# Patient Record
Sex: FEMALE | Race: WHITE | NOT HISPANIC OR LATINO | URBAN - NONMETROPOLITAN AREA
[De-identification: names, ages, dates, MRNs, and addresses within clinical notes are randomized per-mention and may not be internally consistent; named-entity substitution may affect disease eponyms.]

---

## 2015-12-15 PROBLEM — H16.223: Noted: 2019-11-26

## 2015-12-15 PROBLEM — H10.413: Noted: 2020-01-13

## 2015-12-15 PROBLEM — H40.1131: Noted: 2017-01-12

## 2015-12-15 PROBLEM — H52.4: Noted: 2019-11-26

## 2017-07-13 PROBLEM — H52.03: Noted: 2017-07-13

## 2017-07-13 PROBLEM — H40.1131: Noted: 2017-01-12

## 2019-01-31 ENCOUNTER — IMPORTED ENCOUNTER (OUTPATIENT)
Dept: URBAN - NONMETROPOLITAN AREA CLINIC 1 | Facility: CLINIC | Age: 59
End: 2019-01-31

## 2019-01-31 PROCEDURE — 99214 OFFICE O/P EST MOD 30 MIN: CPT

## 2019-01-31 PROCEDURE — 92250 FUNDUS PHOTOGRAPHY W/I&R: CPT

## 2019-01-31 PROCEDURE — 92020 GONIOSCOPY: CPT

## 2019-01-31 PROCEDURE — 92015 DETERMINE REFRACTIVE STATE: CPT

## 2019-01-31 NOTE — PATIENT DISCUSSION
POAG OU:-  Discussed diagnosis in detail w/ pt today. -  Optos done today stable findings. -  Gonio done today grade IV with moderate pigment OU steep approach. -  VF done previously: unreliable OD with scattered defect unreliable OS with no defects-  OCT done previously was stable by GPA -  Hx of Yag PI OU patent on exam today. -  IOP stable at 18 OU previously around 15 OD and 14 OS. Will continue to monitor closely. Discussed punctal occlusion today after instilled drops OU.-  Cornea thickness noted 593  OS -  CDs noted at 0.65 OD and 0.6 OS stable. -  Continue Dorzolamide/Timolol OU BID as directed. May need to consider adding additional treatment/changing drops in future if IOP continues to stay aruond 18. -  Hx of using Combigan but stopped back in 9/2012.-  Continue to monitor every 6 months or PRN. Cataracts OU early-  Educated patient on condition-  No changes stable at this time. -  Continue to monitor for progressionOcular Allergies/WILLIAMS OU-  Educated patient on condition-  Signs/symptoms associated discussed-  Stable continue ATs PRN-  Advised to use OTC Alloway PRN. -  Continue to monitorHyperopia/astig/presby-  Discussed refractive status in detail w/ pt today-  MR done today new GLRx given-  Monitor yearly or PRN; 's Notes: MR  1/31/19DFE  7/13/17OCT 7/26/18Optos  1/31/19VF  7/26/18Gonio  1/31/19PACH 6/14/16 - Kassidy Reasons

## 2019-05-24 ENCOUNTER — IMPORTED ENCOUNTER (OUTPATIENT)
Dept: URBAN - NONMETROPOLITAN AREA CLINIC 1 | Facility: CLINIC | Age: 59
End: 2019-05-24

## 2019-05-24 PROCEDURE — 92133 CPTRZD OPH DX IMG PST SGM ON: CPT

## 2019-05-24 PROCEDURE — 92083 EXTENDED VISUAL FIELD XM: CPT

## 2019-05-24 PROCEDURE — 99214 OFFICE O/P EST MOD 30 MIN: CPT

## 2019-05-24 NOTE — PATIENT DISCUSSION
POAG OU:-  Discussed diagnosis in detail w/ pt today. -  Optos done last visit stable findings. -  Gonio done last visit grade IV with moderate pigment OU steep approach. -  VF done today: Reliable and within normal limits today OU. Better than previous. -  OCT done today was stable by GPA -  Hx of Yag PI OU patent on exam today. -  IOP stable at 18 OU previously around 15 OD and 14 OS. Will continue to monitor closely. Discussed punctal occlusion today after instilled drops OU.-  Cornea thickness noted 593  OS -  CDs noted at 0.65 OD and 0.6 OS stable. -  Continue Dorzolamide/Timolol OU BID as directed. May need to consider adding additional treatment/changing drops in future if IOP continues to stay aruond 18. Had a long conversation about adding another drop or just watching closely. Patient agreed to close monitoring for now so a 2nd drop will not be added at this time. -  Hx of using Combigan but stopped back in 9/2012.-  Continue to monitor every 6 months or PRN. Cataracts OU early-  Educated patient on condition-  No changes stable at this time. -  Continue to monitor for progressionOcular Allergies/WILLIAMS OU-  Educated patient on condition-  Signs/symptoms associated discussed-  Stable continue ATs PRN-  Advised to use OTC Alloway PRN. -  Continue to monitorHyperopia/astig/presby-  Discussed refractive status in detail w/ pt today-  MR done today new GLRx given-  Monitor yearly or PRN; 's Notes: MR  1/31/19DFE  7/13/17OCT 7/26/18Optos  1/31/19VF  7/26/18Gonio  1/31/19PACH 6/14/16 - Josué Ashley

## 2019-11-26 ENCOUNTER — IMPORTED ENCOUNTER (OUTPATIENT)
Dept: URBAN - NONMETROPOLITAN AREA CLINIC 1 | Facility: CLINIC | Age: 59
End: 2019-11-26

## 2019-11-26 PROCEDURE — 99214 OFFICE O/P EST MOD 30 MIN: CPT

## 2019-11-26 PROCEDURE — 92250 FUNDUS PHOTOGRAPHY W/I&R: CPT

## 2019-11-26 NOTE — PATIENT DISCUSSION
POAG OU:- Discussed diagnosis in detail w/ pt today. - Hx of Yag PI OU patent on exam today- Optos done today stable findings OU - Gonio done today grade IV with moderate pigment OU steep approach. - VF done previously Reliable and within normal limits today OU.  Better than previous. - OCT done previously was stable by GPA - PACHY  and - IOP 18 OU - Cup to Disc noted at OD 0.65 and OS 0.6  - Continue Dorzolamide/Timolol OU BID - Start Vyzulta at bedtime OU 2 samples given today (Due to pressures staying around 18)- Target pressure 15- Hx of using Combigan but stopped back in 9/2012- Patient xonsidering Glaucoma study may have [facility] consult with patient in the future - Continue to monitor  - RTC 1 month pressure checkNSC OU- Discussed diagnosis in detail with patient- Discussed signs and symptoms of progression- Discussed UV protection- No treatment needed at this time - Continue to monitorOcular Allergies/WILLIAMS OU- Discussed diagnosis in detail with patient- Discussed signs and symptoms of progression- Recommend patient drinking plenty of water and starting Omega 3’s - Recommend Refresh or Systane  throughout the day- Consider Restasis or plugs in the future if no improvement- Recommend Zaditor or Alaway BID OU PRN - Continue to monitorHyperopia/astig/presby-  Discussed refractive status in detail w/ pt today-  Patient DEFERS 11/26/19-  Continue to monitor; 's Notes: MR  1/31/19DFE  7/13/17OCT 7/26/18Optos  11/26/19VF  7/26/18Gonio  11/26/19PACH 6/14/16 - Sunshine John

## 2020-01-13 ENCOUNTER — IMPORTED ENCOUNTER (OUTPATIENT)
Dept: URBAN - NONMETROPOLITAN AREA CLINIC 1 | Facility: CLINIC | Age: 60
End: 2020-01-13

## 2020-01-13 PROCEDURE — 99213 OFFICE O/P EST LOW 20 MIN: CPT

## 2020-01-13 NOTE — PATIENT DISCUSSION
POAG OU:- Discussed diagnosis in detail w/ pt today. - Hx of Yag PI OU patent on exam today- Optos done previously stable findings OU - Gonio done previously grade IV with moderate pigment OU steep approach. - VF done previously Reliable and within normal limits today OU.  Better than previous. - OCT done previously was stable by GPA - PACHY  and - IOP OD 14 and OS 13- Cup to Disc noted at OD 0.65 and OS 0.6  - Continue Dorzolamide/Timolol OU BID - Continue Vyzulta at bedtime OU Rx sent to pharmacy - Target pressure 15- Hx of using Combigan but stopped back in 9/2012- Continue to monitor  - RTC 4 months F/U with VF [place] OU- Discussed diagnosis in detail with patient- Discussed signs and symptoms of progression- Discussed UV protection- No treatment needed at this time - Continue to monitorOcular Allergies/WILLIAMS OU- Discussed diagnosis in detail with patient- Discussed signs and symptoms of progression- Recommend patient drinking plenty of water and starting Omega 3’s - Recommend Refresh or Systane  throughout the day- Consider Restasis or plugs in the future if no improvement- Recommend Zaditor or Alaway BID OU PRN - Continue to monitorHyperopia/astig/presby-  Discussed refractive status in detail w/ pt today-  Patient DEFERS 11/26/19-  Continue to monitor; 's Notes:   1/31/19DFE  7/13/17OCT 7/26/18Optos  11/26/19VF  7/26/18Gonio  11/26/19PACH 6/14/16 - Lorraine Goodpasture

## 2020-05-27 ENCOUNTER — IMPORTED ENCOUNTER (OUTPATIENT)
Dept: URBAN - NONMETROPOLITAN AREA CLINIC 1 | Facility: CLINIC | Age: 60
End: 2020-05-27

## 2020-05-27 PROCEDURE — 99214 OFFICE O/P EST MOD 30 MIN: CPT

## 2020-05-27 NOTE — PATIENT DISCUSSION
POAG OU:- Discussed diagnosis in detail w/ pt today. - Hx of Yag PI OU patent on exam today- Optos done previously stable findings OU - Gonio done previously grade IV with moderate pigment OU steep approach. - VF done previously Reliable and within normal limits today OU.  Better than previous. - OCT done previously was stable by GPA - PACHY  and - IOP 14 OU- Cup to Disc noted at OD 0.65 and OS 0.6  - Continue Latanoprost QHS OU - Continue Cosopt BID OU - Hx of using Combigan but stopped back in 9/2012- Continue to monitor  - RTC 4 months F/U with VF [place] OU- Discussed diagnosis in detail with patient- Discussed signs and symptoms of progression- Discussed UV protection- No treatment needed at this time - Continue to monitorOcular Allergies/WILLIAMS OU- Discussed diagnosis in detail with patient- Discussed signs and symptoms of progression- Recommend patient drinking plenty of water and starting Omega 3’s - Recommend Refresh or Systane  throughout the day- Consider Restasis or plugs in the future if no improvement- Recommend Zaditor or Alaway BID OU PRN - Continue to monitorHyperopia/astig/presby-  Discussed refractive status in detail w/ pt today-  Patient DEFERS 11/26/19-  Continue to monitor; 's Notes: MR  1/31/19DFE  7/13/17OCT 7/26/18Optos  11/26/19VF  7/26/18Gonio  11/26/19PACH 6/14/16 - Mikle Scheuermann

## 2020-09-28 ENCOUNTER — IMPORTED ENCOUNTER (OUTPATIENT)
Dept: URBAN - NONMETROPOLITAN AREA CLINIC 1 | Facility: CLINIC | Age: 60
End: 2020-09-28

## 2020-09-28 PROBLEM — H16.223: Noted: 2019-11-26

## 2020-09-28 PROBLEM — H40.1131: Noted: 2017-01-12

## 2020-09-28 PROBLEM — H25.813: Noted: 2020-09-28

## 2020-09-28 PROBLEM — H52.4: Noted: 2019-11-26

## 2020-09-28 PROBLEM — H10.413: Noted: 2020-01-13

## 2020-09-28 PROCEDURE — 99213 OFFICE O/P EST LOW 20 MIN: CPT

## 2020-09-28 PROCEDURE — 92083 EXTENDED VISUAL FIELD XM: CPT

## 2020-09-28 NOTE — PATIENT DISCUSSION
POAG OU:- Discussed diagnosis in detail w/ pt today. - Hx of Yag PI OU patent on exam today- Optos done previously stable findings OU - Gonio done previously grade IV with moderate pigment OU steep approach. - VF done today OD shows Fair field with Borderline Paracentral Scotoma and OS shows Good field with Borderline Paracentral Scotoma. Slight progression OU - OCT done previously was stable by GPA - PACHY  and - IOP 18 OU- Cup to Disc noted at OD 0.65 and OS 0.6  - Continue Latanoprost QHS OU - Continue Cosopt BID OU - Hx of using Combigan but stopped back in 9/2012- Patient states that she has just started taking a oral medication for her stomach problems but does not know the name she states that she will call us and let us know what the name of it is. She is wondering if this medicine caused her pressure to go up today.  Pressures did go up to 18 OU today and states that she uses both drops as directed - Continue to monitor  - RTC 3 months F/U with OCT and MR Combined Cataracts OU- Discussed diagnosis in detail with patient- Discussed signs and symptoms of progression- Discussed UV protection- Progression noted today - No treatment needed at this time - Continue to monitorOcular Allergies/WILLIAMS OU- Discussed diagnosis in detail with patient- Discussed signs and symptoms of progression- Recommend patient drinking plenty of water and starting Omega 3’s - Recommend Refresh or Systane  throughout the day- Consider Restasis or plugs in the future if no improvement- Recommend Zaditor or Alaway BID OU PRN - Continue to monitorHyperopia/astig/presby-  Discussed refractive status in detail w/ pt today-  Patient DEFERS 11/26/19-  Continue to monitor; 's Notes: MR  1/31/19DFE  7/13/17OCT 7/26/18Optos  11/26/19VF 9/28/20Gonio  11/26/19PACH 6/14/16 - Jose D Kessler

## 2020-12-28 ENCOUNTER — IMPORTED ENCOUNTER (OUTPATIENT)
Dept: URBAN - NONMETROPOLITAN AREA CLINIC 1 | Facility: CLINIC | Age: 60
End: 2020-12-28

## 2020-12-28 PROCEDURE — 92015 DETERMINE REFRACTIVE STATE: CPT

## 2020-12-28 PROCEDURE — 99213 OFFICE O/P EST LOW 20 MIN: CPT

## 2020-12-28 PROCEDURE — 92133 CPTRZD OPH DX IMG PST SGM ON: CPT

## 2020-12-28 NOTE — PATIENT DISCUSSION
POAG OU:- Discussed diagnosis in detail w/ pt today. - Hx of Yag PI OU patent on exam today- Optos done previously stable findings OU - Gonio done previously grade IV with moderate pigment OU steep approach. - VF done previously OD shows Fair field with Borderline Paracentral Scotoma and OS shows Good field with Borderline Paracentral Scotoma.  Slight progression OU - OCT done today shows Borderline inferior NFL thinning OU - PACHY  and - IOP 18 OU- Cup to Disc noted at OD 0.65 and OS 0.6  - Continue Latanoprost QHS OU - Continue Cosopt BID OU - Hx of using Combigan but stopped back in 9/2012- Continue to monitor  - RTC 4 months F/U with Optos and BAT Combined Cataracts OU- Discussed diagnosis in detail with patient- Discussed signs and symptoms of progression- Discussed UV protection- Progression noted today - No treatment needed at this time - Continue to monitorOcular Allergies/WILLIAMS OU- Discussed diagnosis in detail with patient- Discussed signs and symptoms of progression- Recommend patient drinking plenty of water and starting Omega 3’s - Recommend Refresh or Systane  throughout the day- Recommend Zaditor or Alaway BID OU PRN - Continue to monitorHyperopia/astig/presby-  Discussed refractive status in detail w/ pt today-  New glasses RX given today -  Continue to monitor; 's Notes: MR  12/28/DFE  7/13/17OCT 12/28/20Optos 11/26/19VF 9/28/20Gonio  11/26/19PACH 6/14/16 - Celeste Armstrong

## 2021-05-11 ENCOUNTER — IMPORTED ENCOUNTER (OUTPATIENT)
Dept: URBAN - NONMETROPOLITAN AREA CLINIC 1 | Facility: CLINIC | Age: 61
End: 2021-05-11

## 2021-05-11 PROCEDURE — 99213 OFFICE O/P EST LOW 20 MIN: CPT

## 2021-05-11 PROCEDURE — 92250 FUNDUS PHOTOGRAPHY W/I&R: CPT

## 2021-05-11 NOTE — PATIENT DISCUSSION
POAG OU:- Discussed diagnosis in detail w/ pt today. - Hx of Yag PI OU patent on exam today- Optos done today stable findings OU - Gonio done previously grade IV with moderate pigment OU steep approach. - VF done previously OD shows Fair field with Borderline Paracentral Scotoma and OS shows Good field with Borderline Paracentral Scotoma.  Slight progression OU - OCT done previously shows Borderline inferior NFL thinning OU - PACHY  and - IOP 18 OU- Cup to Disc noted at OD 0.65 and OS 0.6  - Continue Latanoprost QHS OU - Continue Cosopt BID OU - Hx of using Combigan but stopped back in 9/2012- Continue to monitor  - RTC 4 months F/U with VF and BAT Combined Cataracts OU- Discussed diagnosis in detail with patient- Discussed signs and symptoms of progression- Discussed UV protection- Progression noted today - BAT done today OD 20/100 and OS 20/80- No treatment needed at this time - Continue to monitorOcular Allergies/WILLIAMS OU- Discussed diagnosis in detail with patient- Discussed signs and symptoms of progression- Recommend patient drinking plenty of water and starting Omega 3’s - Recommend Refresh or Systane  throughout the day- Recommend Zaditor or Alaway BID OU PRN - Continue to monitorHyperopia/astig/presby-  Discussed refractive status in detail w/ pt today-  Continue to monitor; 's Notes: MR  12/28/DFE  7/13/17OCT 12/28/20Optos 5/11/21VF 9/28/20Gonio  11/26/19PACH 6/14/16 - Leeanna Oseguera

## 2021-10-28 ENCOUNTER — IMPORTED ENCOUNTER (OUTPATIENT)
Dept: URBAN - NONMETROPOLITAN AREA CLINIC 1 | Facility: CLINIC | Age: 61
End: 2021-10-28

## 2021-10-28 ENCOUNTER — PREPPED CHART (OUTPATIENT)
Dept: URBAN - NONMETROPOLITAN AREA CLINIC 1 | Facility: CLINIC | Age: 61
End: 2021-10-28

## 2021-10-28 PROCEDURE — 99213 OFFICE O/P EST LOW 20 MIN: CPT

## 2021-10-28 PROCEDURE — 92083 EXTENDED VISUAL FIELD XM: CPT

## 2021-10-28 NOTE — PATIENT DISCUSSION
POAG OU:- Discussed diagnosis in detail w/ pt today. - Hx of Yag PI OU patent on exam today- Optos done previously stablefindings OU - Gonio done previously grade IV with moderate pigment OU steep approach. - VF done today OU shows Good fields and Normal - OCTdone previously shows Borderline inferior NFL thinning OU - PACHY  and - IOP 17 OU- Cup to Disc noted at OD 0.65 and OS 0.6 -Continue Latanoprost QHS OU - Continue Cosopt BID OU - Hx of using Combigan but stopped back in 9/2012- Continue to monitor Combined CataractsOU- Discussed diagnosis in detail with patient- Discussed signs and symptoms of progression- Discussed UV protection- Progression noted today -BAT done previously OD 20/100 and OS 20/80- No treatment needed at this time - Continue to monitorOcular Allergies/WILLIAMS OU- Discussed diagnosis indetail with patient- Discussed signs and symptoms of progression- Recommend patient drinking plenty of water and starting Omega 3?s - RecommendRefresh or Systane throughout the day- Recommend Zaditor or Alaway BID OU PRN - Continue to monitorHyperopia/astig/presby- Discussed refractivestatus in detail w/ pt today- Continue to monitor.

## 2021-10-28 NOTE — PATIENT DISCUSSION
POAG OU:- Discussed diagnosis in detail w/ pt today.  - Hx of Yag PI OU patent on exam today- Optos done previously stable findings OU - Gonio done previously grade IV with moderate pigment OU steep approach. - VF done today OU shows Good fields and Normal - OCT done previously shows Borderline inferior NFL thinning OU - PACHY  and - IOP 17 OU- Cup to Disc noted at OD 0.65 and OS 0.6  - Continue Latanoprost QHS OU - Continue Cosopt BID OU - Hx of using Combigan but stopped back in 9/2012- Continue to monitor  Combined Cataracts OU- Discussed diagnosis in detail with patient- Discussed signs and symptoms of progression- Discussed UV protection- Progression noted today - BAT done previously OD 20/100 and OS 20/80- No treatment needed at this time - Continue to monitorOcular Allergies/WILLIAMS OU- Discussed diagnosis in detail with patient- Discussed signs and symptoms of progression- Recommend patient drinking plenty of water and starting Omega 3’s - Recommend Refresh or Systane  throughout the day- Recommend Zaditor or Alaway BID OU PRN - Continue to monitorHyperopia/astig/presby-  Discussed refractive status in detail w/ pt today-  Continue to monitor; 's Notes: MR  12/28/DFE  7/13/17OCT 12/28/20Optos 5/11/21VF 10/28/21Gonio  11/26/19PACH 6/14/16 - Barabara Essex

## 2022-03-20 ASSESSMENT — VISUAL ACUITY
OD_CC: 20/20-1
OS_CC: 20/20-1

## 2022-03-20 ASSESSMENT — PACHYMETRY
OS_CT_UM: 540
OD_CT_UM: 593

## 2022-03-21 ENCOUNTER — FOLLOW UP (OUTPATIENT)
Dept: URBAN - NONMETROPOLITAN AREA CLINIC 1 | Facility: CLINIC | Age: 62
End: 2022-03-21

## 2022-03-21 DIAGNOSIS — H40.1131: ICD-10-CM

## 2022-03-21 PROCEDURE — 99213 OFFICE O/P EST LOW 20 MIN: CPT

## 2022-03-21 PROCEDURE — 92250 FUNDUS PHOTOGRAPHY W/I&R: CPT

## 2022-03-21 ASSESSMENT — TONOMETRY
OS_IOP_MMHG: 17
OD_IOP_MMHG: 16

## 2022-03-21 ASSESSMENT — VISUAL ACUITY
OS_CC: 20/20-2
OD_CC: 20/22

## 2022-03-31 ENCOUNTER — EMERGENCY VISIT (OUTPATIENT)
Dept: URBAN - NONMETROPOLITAN AREA CLINIC 1 | Facility: CLINIC | Age: 62
End: 2022-03-31

## 2022-03-31 DIAGNOSIS — H00.011: ICD-10-CM

## 2022-03-31 PROCEDURE — 99213 OFFICE O/P EST LOW 20 MIN: CPT

## 2022-03-31 RX ORDER — CEPHALEXIN 500 MG/1: 1 CAPSULE ORAL TWICE A DAY

## 2022-03-31 ASSESSMENT — VISUAL ACUITY
OS_CC: 20/20
OD_CC: 20/20

## 2022-03-31 ASSESSMENT — TONOMETRY
OD_IOP_MMHG: 16
OS_IOP_MMHG: 17

## 2022-03-31 NOTE — PATIENT DISCUSSION
Recommend HOT compresses 10-15 mins on and 45 mins. start Keflex 500mg BID X 7 days. RX sent to pharmacy.

## 2022-04-09 ASSESSMENT — VISUAL ACUITY
OD_SC: 20/20
OS_CC: 20/40
OS_CC: 20/30-2
OS_SC: 20/20
OD_PH: 20/20-
OD_CC: 20/25-2
OD_SC: 20/20
OS_CC: 20/20-
OD_CC: 20/20-2
OS_SC: 20/20-1
OD_SC: 20/20-
OD_CC: 20/40-1
OS_GLARE: 20/60
OS_GLARE: 20/80
OU_CC: 20/25-2
OS_SC: 20/20
OD_SC: 20/20
OS_CC: 20/29-2
OD_GLARE: 20/50
OS_PH: 20/20-
OD_CC: 20/30-1
OD_SC: 20/20-1
OD_GLARE: 20/100
OS_SC: 20/20
OS_SC: 20/20-

## 2022-04-09 ASSESSMENT — PACHYMETRY
OS_CT_UM: 540; ADJ: THIN
OD_CT_UM: 593; ADJ: THICK
OS_CT_UM: 540; ADJ: THIN
OD_CT_UM: 593; ADJ: THICK
OS_CT_UM: 540; ADJ: THIN
OS_CT_UM: 540; ADJ: THIN
OD_CT_UM: 593; ADJ: THICK
OD_CT_UM: 593; ADJ: THICK
OS_CT_UM: 540; ADJ: THIN
OD_CT_UM: 593; ADJ: THICK
OS_CT_UM: 540; ADJ: THIN
OS_CT_UM: 540; ADJ: THIN
OD_CT_UM: 593; ADJ: THICK
OS_CT_UM: 540; ADJ: THIN
OS_CT_UM: 540; ADJ: THIN
OD_CT_UM: 593; ADJ: THICK

## 2022-04-09 ASSESSMENT — TONOMETRY
OD_IOP_MMHG: 18
OS_IOP_MMHG: 18
OD_IOP_MMHG: 18
OS_IOP_MMHG: 18
OS_IOP_MMHG: 18
OS_IOP_MMHG: 14
OS_IOP_MMHG: 13
OS_IOP_MMHG: 18
OD_IOP_MMHG: 14
OD_IOP_MMHG: 14
OD_IOP_MMHG: 18
OD_IOP_MMHG: 18
OS_IOP_MMHG: 17
OD_IOP_MMHG: 18
OS_IOP_MMHG: 18
OS_IOP_MMHG: 17
OS_IOP_MMHG: 18
OD_IOP_MMHG: 17

## 2022-09-22 ENCOUNTER — ESTABLISHED PATIENT (OUTPATIENT)
Dept: URBAN - NONMETROPOLITAN AREA CLINIC 1 | Facility: CLINIC | Age: 62
End: 2022-09-22

## 2022-09-22 DIAGNOSIS — H52.4: ICD-10-CM

## 2022-09-22 PROCEDURE — 92014 COMPRE OPH EXAM EST PT 1/>: CPT

## 2022-09-22 PROCEDURE — 92015 DETERMINE REFRACTIVE STATE: CPT

## 2022-09-22 ASSESSMENT — TONOMETRY
OD_IOP_MMHG: 16
OS_IOP_MMHG: 16

## 2022-09-22 ASSESSMENT — VISUAL ACUITY
OS_CC: 20/20
OD_CC: 20/20-1

## 2023-07-20 ENCOUNTER — FOLLOW UP (OUTPATIENT)
Dept: URBAN - NONMETROPOLITAN AREA CLINIC 1 | Facility: CLINIC | Age: 63
End: 2023-07-20

## 2023-07-20 DIAGNOSIS — H16.223: ICD-10-CM

## 2023-07-20 DIAGNOSIS — H40.1131: ICD-10-CM

## 2023-07-20 DIAGNOSIS — H25.813: ICD-10-CM

## 2023-07-20 PROCEDURE — 99214 OFFICE O/P EST MOD 30 MIN: CPT

## 2023-07-20 PROCEDURE — 92133 CPTRZD OPH DX IMG PST SGM ON: CPT

## 2023-07-20 PROCEDURE — 92083 EXTENDED VISUAL FIELD XM: CPT

## 2023-07-20 ASSESSMENT — VISUAL ACUITY
OU_CC: 20/20
OS_CC: 20/20
OD_CC: 20/20-

## 2023-07-20 ASSESSMENT — TONOMETRY
OS_IOP_MMHG: 17
OD_IOP_MMHG: 16

## 2024-01-30 ENCOUNTER — ESTABLISHED PATIENT (OUTPATIENT)
Dept: URBAN - NONMETROPOLITAN AREA CLINIC 1 | Facility: CLINIC | Age: 64
End: 2024-01-30

## 2024-01-30 DIAGNOSIS — H52.4: ICD-10-CM

## 2024-01-30 PROCEDURE — 92014 COMPRE OPH EXAM EST PT 1/>: CPT

## 2024-01-30 PROCEDURE — 92015 DETERMINE REFRACTIVE STATE: CPT

## 2024-01-30 ASSESSMENT — TONOMETRY
OD_IOP_MMHG: 15
OS_IOP_MMHG: 15

## 2024-01-30 ASSESSMENT — VISUAL ACUITY
OD_CC: 20/20
OU_CC: 20/20
OS_CC: 20/20-1

## 2024-08-01 ENCOUNTER — FOLLOW UP (OUTPATIENT)
Dept: URBAN - NONMETROPOLITAN AREA CLINIC 1 | Facility: CLINIC | Age: 64
End: 2024-08-01

## 2024-08-01 DIAGNOSIS — H25.813: ICD-10-CM

## 2024-08-01 DIAGNOSIS — H40.1131: ICD-10-CM

## 2024-08-01 DIAGNOSIS — H16.223: ICD-10-CM

## 2024-08-01 PROCEDURE — 99213 OFFICE O/P EST LOW 20 MIN: CPT

## 2024-08-01 PROCEDURE — 92133 CPTRZD OPH DX IMG PST SGM ON: CPT

## 2024-08-01 PROCEDURE — 92083 EXTENDED VISUAL FIELD XM: CPT

## 2024-08-01 ASSESSMENT — VISUAL ACUITY
OU_CC: 20/20
OS_CC: 20/20-1
OD_CC: 20/20-1

## 2024-08-01 ASSESSMENT — TONOMETRY
OS_IOP_MMHG: 15
OD_IOP_MMHG: 15

## 2025-05-02 ENCOUNTER — COMPREHENSIVE EXAM (OUTPATIENT)
Age: 65
End: 2025-05-02

## 2025-05-02 DIAGNOSIS — H52.4: ICD-10-CM

## 2025-05-02 PROCEDURE — 92014 COMPRE OPH EXAM EST PT 1/>: CPT

## 2025-05-02 PROCEDURE — 92015 DETERMINE REFRACTIVE STATE: CPT
